# Patient Record
Sex: FEMALE | Race: WHITE
[De-identification: names, ages, dates, MRNs, and addresses within clinical notes are randomized per-mention and may not be internally consistent; named-entity substitution may affect disease eponyms.]

---

## 2017-10-03 ENCOUNTER — HOSPITAL ENCOUNTER (EMERGENCY)
Dept: HOSPITAL 76 - ED | Age: 22
Discharge: HOME | End: 2017-10-03
Payer: COMMERCIAL

## 2017-10-03 VITALS — SYSTOLIC BLOOD PRESSURE: 99 MMHG | DIASTOLIC BLOOD PRESSURE: 74 MMHG
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
## 2017-10-03 DIAGNOSIS — S51.852A: Primary | ICD-10-CM
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
## 2017-10-03 PROCEDURE — 99283 EMERGENCY DEPT VISIT LOW MDM: CPT

## 2017-10-03 RX ADMIN — HYDROCODONE BITARTRATE AND ACETAMINOPHEN STA TAB: 5; 325 TABLET ORAL at 18:13

## 2017-10-03 NOTE — ED PHYSICIAN DOCUMENTATION
PD HPI ANIMAL BITE





- Stated complaint


Stated Complaint: LT ARM LAC





- Chief complaint


Chief Complaint: Wound





- History obtained from


History obtained from: Patient, Family





- History of Present Illness


Location of injury(ies): LUE


Details of the event: Dog, Bite, Immunization unknown, Provoked (walked past 

animals domain)


Timing - onset: Today


Timing - duration: Minutes


Timing - details: Abrupt onset, Still present


Improved by: Rest, Immobilization


Worsened by: Moving, Palpating


Associated symptoms: No: Weakness, Numbness, Tingling, Swelling


Contributing factors: No: Immunocompromised


Similar symptoms before: Has not had sx before


Recently seen: Not recently seen





- Additional information


Additional information: 





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 

another dog in the neighborhood was unrestrained and came up and bit her in the 

arm.  She has deep puncture wounds to the left forearm she has some pain with 

flexion extension of her wrist and fingers and with movement of her arm in 

general.





Review of Systems


Constitutional: denies: Fever, Chills


Respiratory: denies: Cough


GI: denies: Vomiting


: denies: Dysuria


Skin: reports: Laceration (s), Bite / sting.  denies: Rash


Musculoskeletal: reports: Extremity pain.  denies: Neck pain, Back pain





PD PAST MEDICAL HISTORY





- Past Medical History


Past Medical History: No





- Past Surgical History


Past Surgical History: No





- Present Medications


Home Medications: 


 Ambulatory Orders











 Medication  Instructions  Recorded  Confirmed


 


Amox/Clav 875/125 [Augmentin] 1 each PO Q12H #10 tablet 10/03/17 


 


HYDROcod/ACETAM 5/325 [Norco 5/325] 1 - 2 ea PO Q6H PRN #15 tablet 10/03/17 














- Allergies


Allergies/Adverse Reactions: 


 Allergies











Allergy/AdvReac Type Severity Reaction Status Date / Time


 


No Known Drug Allergies Allergy   Verified 10/03/17 17:29














- Social History


Does the pt smoke?: No


Smoking Status: Never smoker





- Immunizations


Immunizations are current?: Yes





PD ED PE NORMAL





- Vitals


Vital signs reviewed: Yes (normal )





- General


General: No acute distress, Well developed/nourished





- HEENT


HEENT: Atraumatic, PERRL





- Respiratory


Respiratory: No respiratory distress





- Derm


Derm: Normal color, Warm and dry, No rash





- Extremities


Extremities: No deformity, No edema, Other (There are 4 puncute wound 

lacerations consistent with a bite from a large dog over the volar and dorsal 

surfaces of the left forearm about mid forearm. Distal n/v is intact. )





- Neuro


Neuro: No motor deficit, No sensory deficit





- Psych


Psych: Normal mood, Normal affect





Results





- Vitals


Vitals: 


 Vital Signs - 24 hr











  10/03/17





  17:28


 


Temperature 36.0 C L


 


Heart Rate 95


 


Respiratory 16





Rate 


 


Blood Pressure 99/74


 


O2 Saturation 100








 Oxygen











O2 Source                      Room air

















PD MEDICAL DECISION MAKING





- ED course


Complexity details: reviewed old records, considered differential, d/w patient


ED course: 





22-year-old active [employer] female with a deep dog bite wound to the left 

forearm has 4 puncture wounds.  These are left open and they are cleaned with 

saline and the patient will be put on some antibiotic prophylaxis.  She is up-to

-date on her tetanus.  Animal control will be notified.





Departure





- Departure


Disposition: 01 Home, Self Care


Clinical Impression: 


 Animal bite with open wound





Condition: Stable


Instructions:  ED Bite Animal General


Follow-Up: 


[facility] [place] [Provider Group]


Prescriptions: 


Amox/Clav 875/125 [Augmentin] 1 each PO Q12H #10 tablet


HYDROcod/ACETAM 5/325 [Norco 5/325] 1 - 2 ea PO Q6H PRN #15 tablet


 PRN Reason: Pain

## 2019-04-11 ENCOUNTER — HOSPITAL ENCOUNTER (OUTPATIENT)
Dept: HOSPITAL 76 - DI | Age: 24
Discharge: HOME | End: 2019-04-11
Attending: FAMILY MEDICINE
Payer: COMMERCIAL

## 2019-04-11 DIAGNOSIS — S59.912A: Primary | ICD-10-CM

## 2019-04-11 DIAGNOSIS — R25.1: ICD-10-CM

## 2019-04-11 DIAGNOSIS — R53.1: ICD-10-CM

## 2019-04-11 PROCEDURE — 73220 MRI UPPR EXTREMITY W/O&W/DYE: CPT

## 2019-04-11 NOTE — MRI REPORT
Reason:  UNSPECIFIED INJURY OF LEFT FOREARM,INITIAL ENCOUNT

Procedure Date:  04/11/2019   

Accession Number:  [id number] / [id number]                    

Procedure:  MRI - Forearm LT W/WO CPT Code:  

 

FULL RESULT:

 

 

EXAM:

LEFT FOREARM MRI WITHOUT AND WITH CONTRAST

 

EXAM DATE: 4/11/2019 10:45 AM.

 

CLINICAL HISTORY: Left forearm injury

 

COMPARISON: None.

 

TECHNIQUE: Multiplanar, multisequence T1-weighted and fluid-sensitive 

sequences of the forearm before and after administration of intravenous 

contrast. IV contrast: 9 mL Gadavist. Other: None.

 

FINDINGS:

Bones: No fractures or subluxations. No marrow edema or abnormal 

enhancement. No bone lesions.

 

Joint Spaces: Visualized portions of the wrist and elbow joints are 

unremarkable.

 

Ligaments: The interosseous membrane is intact.

 

Tendons: Where visualized, the proximal wrist tendons are intact

 

Musculature: No edema or fatty atrophy.

 

Other: The subcutaneous tissues are unremarkable. No abscess or 

cellulitis.

IMPRESSION: No MRI abnormalities in the forearm.

 

[facility] MUSCULOSKELETAL RADIOLOGY SECTION

## 2019-06-14 ENCOUNTER — HOSPITAL ENCOUNTER (OUTPATIENT)
Age: 24
End: 2019-06-14
Payer: OTHER GOVERNMENT

## 2019-06-14 DIAGNOSIS — R25.1: ICD-10-CM

## 2019-06-14 DIAGNOSIS — R53.1: Primary | ICD-10-CM

## 2019-06-14 DIAGNOSIS — G54.9: ICD-10-CM

## 2019-06-14 PROCEDURE — 72141 MRI NECK SPINE W/O DYE: CPT

## 2019-06-14 NOTE — DI.MRI.S_ITS
PROCEDURE:  MR CERVICAL SPINE WO CON  
   
INDICATIONS:  Weakness  
   
TECHNIQUE:    
Noncontrast sagittal T1 spin echo and T2 fast spin echo, sagittal STIR, foraminal oblique   
sagittal T2 fast spin echo, and axial gradient echo or T2 fast spin echo through the   
cervical spine.    
   
COMPARISON:  None.  
   
FINDINGS:    
Image quality:  Excellent.    
   
Alignment and Curvature:  There is normal bony alignment.    
   
Bone Marrow:  Marrow demonstrates normal overall signal.    
   
Spinal Cord:  Visualized spinal cord has normal size and signal.  No cerebellar tonsillar   
herniation.    
   
Paraspinous Soft Tissues:  No paravertebral masses.  Prevertebral soft tissues are normal   
in thickness.    
   
C2-C3:  Normal appearance.    
   
C3-C4:  Normal appearance.    
   
C4-C5:  Normal appearance.    
   
C5-C6:  Normal appearance.    
   
C6-C7:  Normal appearance.    
   
C7-T1:  Normal appearance.    
   
   
   
IMPRESSION: Unremarkable cervical spine MRI, without a significant level of disc   
pathology, central canal narrowing, or neural foraminal narrowing.  
   
   
   
Dictated by: Shiv Baca M.D. on 6/14/2019 at 13:45       
Approved by: Shiv Baca M.D. on 6/14/2019 at 13:46